# Patient Record
Sex: FEMALE | URBAN - METROPOLITAN AREA
[De-identification: names, ages, dates, MRNs, and addresses within clinical notes are randomized per-mention and may not be internally consistent; named-entity substitution may affect disease eponyms.]

---

## 2017-03-09 ENCOUNTER — OFFICE VISIT (OUTPATIENT)
Dept: URGENT CARE | Facility: URGENT CARE | Age: 27
End: 2017-03-09
Payer: COMMERCIAL

## 2017-03-09 VITALS
OXYGEN SATURATION: 99 % | WEIGHT: 196.6 LBS | SYSTOLIC BLOOD PRESSURE: 122 MMHG | BODY MASS INDEX: 35.27 KG/M2 | TEMPERATURE: 99.1 F | DIASTOLIC BLOOD PRESSURE: 73 MMHG | HEART RATE: 82 BPM

## 2017-03-09 DIAGNOSIS — N63.0 BREAST SWELLING: ICD-10-CM

## 2017-03-09 DIAGNOSIS — N61.1 ABSCESS OF BREAST, RIGHT: ICD-10-CM

## 2017-03-09 DIAGNOSIS — R68.83 CHILLS: Primary | ICD-10-CM

## 2017-03-09 LAB
BASOPHILS # BLD AUTO: 0 10E9/L (ref 0–0.2)
BASOPHILS NFR BLD AUTO: 0.2 %
DIFFERENTIAL METHOD BLD: ABNORMAL
EOSINOPHIL # BLD AUTO: 0.2 10E9/L (ref 0–0.7)
EOSINOPHIL NFR BLD AUTO: 2.5 %
ERYTHROCYTE [DISTWIDTH] IN BLOOD BY AUTOMATED COUNT: 16.5 % (ref 10–15)
HCT VFR BLD AUTO: 40.1 % (ref 35–47)
HGB BLD-MCNC: 12.9 G/DL (ref 11.7–15.7)
LYMPHOCYTES # BLD AUTO: 1.8 10E9/L (ref 0.8–5.3)
LYMPHOCYTES NFR BLD AUTO: 19.8 %
MCH RBC QN AUTO: 25.5 PG (ref 26.5–33)
MCHC RBC AUTO-ENTMCNC: 32.2 G/DL (ref 31.5–36.5)
MCV RBC AUTO: 79 FL (ref 78–100)
MONOCYTES # BLD AUTO: 0.5 10E9/L (ref 0–1.3)
MONOCYTES NFR BLD AUTO: 5.5 %
NEUTROPHILS # BLD AUTO: 6.4 10E9/L (ref 1.6–8.3)
NEUTROPHILS NFR BLD AUTO: 72 %
PLATELET # BLD AUTO: 230 10E9/L (ref 150–450)
RBC # BLD AUTO: 5.05 10E12/L (ref 3.8–5.2)
WBC # BLD AUTO: 8.9 10E9/L (ref 4–11)

## 2017-03-09 PROCEDURE — 99213 OFFICE O/P EST LOW 20 MIN: CPT | Performed by: PHYSICIAN ASSISTANT

## 2017-03-09 PROCEDURE — 36415 COLL VENOUS BLD VENIPUNCTURE: CPT | Performed by: PHYSICIAN ASSISTANT

## 2017-03-09 PROCEDURE — 85025 COMPLETE CBC W/AUTO DIFF WBC: CPT | Performed by: PHYSICIAN ASSISTANT

## 2017-03-09 RX ORDER — CLINDAMYCIN HCL 300 MG
300 CAPSULE ORAL 4 TIMES DAILY
Qty: 40 CAPSULE | Refills: 0 | Status: SHIPPED | OUTPATIENT
Start: 2017-03-09

## 2017-03-09 ASSESSMENT — ENCOUNTER SYMPTOMS
DIZZINESS: 0
TINGLING: 0
CHILLS: 0
FEVER: 0
VOMITING: 0
HEMOPTYSIS: 0
COUGH: 0
WHEEZING: 0
SHORTNESS OF BREATH: 0
SPUTUM PRODUCTION: 0
ABDOMINAL PAIN: 0
NAUSEA: 0

## 2017-03-09 NOTE — LETTER
[facility] [address]  [address]  [facility] [address]          March 9, 2017    RE:  Manjula Olson                                                                                                                                                       [address]   [facility] [address]            To whom it may concern:    Manjula Olson is under my professional care. Please excuse her from work   Friday and possibly Tuesday. Thanks!    Sincerely,        Christiano Davis PA-C

## 2017-03-09 NOTE — MR AVS SNAPSHOT
After Visit Summary   3/9/2017    Manjula Olson    MRN: [id number]           Patient Information     Date Of Birth          1990        Visit Information        Provider Department      3/9/2017 6:00 PM Christiano Davis PA-C [facility]        Today's Diagnoses     Chills    -  1    Breast swelling        Abscess of breast, right          Care Instructions      What Are Benign Breast Conditions?  Most breast conditions are benign (noncancerous), causing no serious harm to you. But all women are at some risk for breast cancer. Your risk increases as you grow older. So if you notice any breast changes that aren t normal for you, see your healthcare provider. This helps to make sure that you receive proper treatment if there is a problem.  Breast infection  Infections of the breast tissue can cause skin redness, warmth, and pain or tenderness. The most common infection is mastitis. This inflammation of the mammary glands can happen during breastfeeding. Mastitis and other breast infections are often treated with antibiotics.  Nipple discharge  Many women can squeeze a tiny amount of a clear or milky discharge from 1 or both nipples. This discharge may be normal. Other kinds of discharge may be symptoms of a breast condition. A dark discharge can be caused by an intraductal papilloma (a benign growth in a duct near the nipple). A nipple discharge that is dark or bloody, or that happens without squeezing your nipple, should be checked by your healthcare provider.    Fibrocystic changes  These benign changes may cause a thickening in the breasts. Breasts may be tender or painful. They may feel more dense in some areas than in others. Sometimes solid or fluid-filled lumps (cysts) may also form. Cysts may be smooth, soft or firm, and tender. They may become larger and more tender right before your period.    Benign breast lumps  Benign breast lumps come in all shapes, textures, and  sizes. A fibroadenoma (a lump of fibrous tissue) may be smooth, firm, and rubbery. This type of lump is usually painless and movable. Have any lump checked by your healthcare provider.    0782-7045 [employer]. [address] [place] [address]. All rights reserved. This information is not intended as a substitute for professional medical care. Always follow your healthcare professional's instructions.        Breast Lump, Uncertain Cause    A lump was found in your breast. Most breast lumps are not cancer. They may be caused by normal changes in the breast tissue due to hormone variations that occur with your menstrual cycle. Some women may form lumps that are painful and tender. Others may form lumps that are painless.  At this time, is not possible to be certain of the cause of your lump without further evaluation. This could include:    Another exam by your healthcare provider or a gynecologist    Imaging tests, such as a mammogram or ultrasound    Biopsy (procedure to remove small tissue samples from the breast lump)  Your healthcare provider will explain any additional testing that is needed. Be sure to get answers to any questions you may have.  Home care  Until a diagnosis is made, you may be advised to do the following:    If you are having breast pain:    Take an over-the-counter pain reliever, if directed to by your provider.    Wear a well-fitted bra or sports bra for extra support. If you have breast pain at night, try wearing the bra during sleep.    Apply a warm compress (towel soaked in warm water) to the breast. You may also use a hot water bottle.    Check your breasts each day. Keep a log of whether the lump seems to be changing in size or tenderness with your period. This can help your healthcare provider make the correct diagnosis.  Follow-up care  Follow up with your healthcare provider as directed. Keep all appointments. Also, prepare for any upcoming tests as  directed.  When to seek medical advice  Call your healthcare provider right away if any of these occur:    Fever of 100.4 F (38 C) or higher    Redness or swelling of the breast    Discharge from the nipple    Visible changes in the skin over the nipple or breast    Lump grows larger, feels very hard, or has an irregular shape    New lumps form    3021-4848 [employer]. [address] [place] [address]. All rights reserved. This information is not intended as a substitute for professional medical care. Always follow your healthcare professional's instructions.        * ABSCESS [Antibiotic treatment only]  An abscess (sometimes called a  boil ) occurs when bacteria get trapped under the skin and begin to grow. Pus forms inside the abscess as the body responds to the bacteria. An abscess can occur with an insect bite, ingrown hair, blocked oil gland, pimple, cyst, or puncture wound.  In the early stages, redness and tenderness are the only symptoms. Sometimes, this stage can be treated with antibiotics alone. If the abscess does not respond to antibiotic treatment, it will need to be drained with a small cut, under local anesthesia.  HOME CARE:    Soak the wound in hot water or apply hot packs (small towel soaked in hot water) to the area for 20 minutes at a time. Do this three to four times a day.    Apply antibiotic cream or ointment such as Bacitracin or Polysporin onto the skin 3-4 times a day, unless something else was prescribed.  Neosporin Plus  includes an antibiotic plus a local pain reliever.    Take all of the antibiotics until they are gone.    You may use acetaminophen (Tylenol) or ibuprofen (Motrin, Advil) to control pain, unless another pain medicine was prescribed. [ NOTE : If you have chronic liver or kidney disease or ever had a stomach ulcer or GI bleeding, talk with your doctor before using these any of these.]  FOLLOW UP as advised by our staff. Look at your wound each day  for the signs of worsening infection listed below.  GET PROMPT MEDICAL ATTENTION if any of the following occur:    An increase in redness or swelling    Red streaks in the skin leading away from the abscess    An increase in local pain or swelling    Fever of 100.4 F (38 C) or higher, or as directed by your healthcare provider    Pus or fluid coming from the abscess    9333-3879 [employer], [address] [place] [address], [place] [address]. All rights reserved. This information is not intended as a substitute for professional medical care. Always follow your healthcare professional's instructions.          Follow-ups after your visit        Additional Services     GENERAL SURG ADULT REFERRAL       Your provider has referred you to: [facility]: [facility] [phone]   [email or web address]    Please be aware that coverage of these services is subject to the terms and limitations of your health insurance plan.  Call member services at your health plan with any benefit or coverage questions.      Please bring the following with you to your appointment:    (1) Any X-Rays, CTs or MRIs which have been performed.  Contact the facility where they were done to arrange for  prior to your scheduled appointment.   (2) List of current medications   (3) This referral request   (4) Any documents/labs given to you for this referral                  Who to contact     If you have questions or need follow up information about today's clinic visit or your schedule please contact [facility] directly at [phone].  Normal or non-critical lab and imaging results will be communicated to you by MyChart, letter or phone within 4 business days after the clinic has received the results. If you do not hear from us within 7 days, please contact the clinic through MyChart or phone. If you have a critical or abnormal lab result, we  "will notify you by phone as soon as possible.  Submit refill requests through [employer] or call your pharmacy and they will forward the refill request to us. Please allow 3 business days for your refill to be completed.          Additional Information About Your Visit        [employer] Information     [employer] lets you send messages to your doctor, view your test results, renew your prescriptions, schedule appointments and more. To sign up, go to www.[facility] . Click on \"Log in\" on the left side of the screen, which will take you to the Welcome page. Then click on \"Sign up Now\" on the right side of the page.     You will be asked to enter the access code listed below, as well as some personal information. Please follow the directions to create your username and password.     Your access code is: [email or web address]  Expires: 2017  8:11 PM     Your access code will  in 90 days. If you need help or a new code, please call your [facility] clinic or [phone].        Care EveryWhere ID     This is your Care EveryWhere ID. This could be used by other organizations to access your [facility] medical records  [id number]        Your Vitals Were     Pulse Temperature Pulse Oximetry BMI (Body Mass Index)          82 99.1  F (37.3  C) (Oral) 99% 35.27 kg/m2         Blood Pressure from Last 3 Encounters:   17 122/73   16 105/60   10/02/15 108/62    Weight from Last 3 Encounters:   17 196 lb 9.6 oz (89.2 kg)   16 189 lb 8 oz (86 kg)   10/02/15 179 lb 8 oz (81.4 kg)              We Performed the Following     CBC with platelets and differential     GENERAL SURG ADULT REFERRAL          Today's Medication Changes          These changes are accurate as of: 3/9/17  8:11 PM.  If you have any questions, ask your nurse or doctor.               Start taking these medicines.        Dose/Directions    clindamycin 300 MG capsule   Commonly known as:  CLEOCIN   Used for:  Abscess of breast, right, Breast " swelling   Started by:  Christiano Davis PA-C        Dose:  300 mg   Take 1 capsule (300 mg) by mouth 4 times daily   Quantity:  40 capsule   Refills:  0            Where to get your medicines      These medications were sent to [facility][id number] [facility] [address] [place] [address] AT COR[facility]  [address] [place] [address]     Phone:  [phone]     clindamycin 300 MG capsule                Primary Care Provider Office Phone # Fax #    Nicole Harshal Kenney [phone]       [address] [address]        Thank you!     Thank you for choosing [facility]  for your care. Our goal is always to provide you with excellent care. Hearing back from our patients is one way we can continue to improve our services. Please take a few minutes to complete the written survey that you may receive in the mail after your visit with us. Thank you!             Your Updated Medication List - Protect others around you: Learn how to safely use, store and throw away your medicines at www.disposemymeds.org.          This list is accurate as of: 3/9/17  8:11 PM.  Always use your most recent med list.                   Brand Name Dispense Instructions for use    clindamycin 300 MG capsule    CLEOCIN    40 capsule    Take 1 capsule (300 mg) by mouth 4 times daily       nabumetone 750 MG tablet    RELAFEN    30 tablet    Take 1 tablet (750 mg) by mouth 2 times daily as needed for pain       senna-docusate 8.6-50 MG per tablet    SENOKOT-S;PERICOLACE    60 tablet    Take 1 tablet by mouth 2 times daily as needed for constipation

## 2017-03-10 NOTE — NURSING NOTE
"Chief Complaint   Patient presents with     Breast Pain     right        Initial /73 (BP Location: Left arm, Patient Position: Chair, Cuff Size: Adult Large)  Pulse 82  Temp 99.1  F (37.3  C) (Oral)  Wt 196 lb 9.6 oz (89.2 kg)  SpO2 99%  BMI 35.27 kg/m2 Estimated body mass index is 35.27 kg/(m^2) as calculated from the following:    Height as of 8/2/16: 5' 2.6\" (1.59 m).    Weight as of this encounter: 196 lb 9.6 oz (89.2 kg).  Medication Reconciliation: complete     Liat Sanchez MA    "

## 2017-03-10 NOTE — PROGRESS NOTES
HPI  Rt breast pain woke this AM, pains goes into back with certain motions, Flew to [place],   No hx clotting, not a smoker, no OBC, , lives at home alone with  , felt fine yesterday , felt fine   When went to bed.    Just joined gym Tuesday worked out on Tuesday ; + hx constipation, had good BM today  Pain centered in rt breast, says no breast masses or any such symptoms. Has has breast discharge for  Some time, has not breast fed for over 10 years    No hx asthma, no short of breath, cough 2 weeks ago, cold like sx, last weekend had headache   Migraine like    No FH breast CA, but mother  from unknown reasons        Review of Systems   Constitutional: Negative for chills and fever.   HENT: Negative for congestion.    Respiratory: Negative for cough, hemoptysis, sputum production, shortness of breath and wheezing.    Gastrointestinal: Negative for abdominal pain, nausea and vomiting.   Neurological: Negative for dizziness and tingling.         Physical Exam   Constitutional: She is oriented to person, place, and time and well-developed, well-nourished, and in no distress. No distress.   HENT:   Head: Normocephalic.   Right Ear: External ear normal.   Left Ear: External ear normal.   Nose: Nose normal.   Mouth/Throat: Oropharynx is clear and moist.   Nl tonsils, nl midline uvula, mucous membranes moist, no lesions, No cervical, no axillary, no epitrochlear, no inguinal, no popliteal, no supraclavicular adenopathy, no hepatosplenomegaly     Eyes: Conjunctivae and EOM are normal. Pupils are equal, round, and reactive to light. Right eye exhibits no discharge. Left eye exhibits no discharge. No scleral icterus.   Neck: Normal range of motion. Neck supple. No tracheal deviation present. No thyromegaly present.   No passive neck flexion pain in supine position     Cardiovascular: Normal rate, regular rhythm, normal heart sounds and intact distal pulses.    No murmur heard.  Pulmonary/Chest: Effort normal and  breath sounds normal. No respiratory distress. She has no wheezes. She exhibits no tenderness.   Respiratory rate normal, no stridor, no respiratory distress,No wheeze, no retractions, no rales, nl bronchophony and fremitus, non-tender to palp      + rt breast 5 x 7 palpable tender mass, smooth, deep, much like a cystic mass or forming abscess with no fluctuance, no head, + very ttp, no expressible dc from nipple, mass extends from deep behind nipple 2 -3 cm medial and lateral and then superiorly towards but not into tail of haq with no palpable axillary nodes+, with pt anteriorly flex at hips no breast deformity, trapping no peu de'orange appearance. Is symmetric bilat breasts   Abdominal: Soft. Bowel sounds are normal. She exhibits no distension. There is no tenderness. There is no guarding.   rounded, symmetric, normal bowel sounds, neg Anthony's sign, neg Mcburney's sig, no Kehr's sign, heel drop pain, neg psoas sign and neg obturator sign     Musculoskeletal: Normal range of motion.   Lymphadenopathy:     She has no cervical adenopathy.   Neurological: She is alert and oriented to person, place, and time. No cranial nerve deficit. Gait normal. Coordination normal.   Skin: Skin is warm. No rash noted. She is not diaphoretic. No erythema.   Psychiatric: Affect normal.     Plan:   Discussed at length [facility], discussed that rarely CA  Can start like  thsi her hx is much more consistent with an abscess. I walked pt to [facility] to get asap surgical consult.

## 2017-03-10 NOTE — PATIENT INSTRUCTIONS
What Are Benign Breast Conditions?  Most breast conditions are benign (noncancerous), causing no serious harm to you. But all women are at some risk for breast cancer. Your risk increases as you grow older. So if you notice any breast changes that aren t normal for you, see your healthcare provider. This helps to make sure that you receive proper treatment if there is a problem.  Breast infection  Infections of the breast tissue can cause skin redness, warmth, and pain or tenderness. The most common infection is mastitis. This inflammation of the mammary glands can happen during breastfeeding. Mastitis and other breast infections are often treated with antibiotics.  Nipple discharge  Many women can squeeze a tiny amount of a clear or milky discharge from 1 or both nipples. This discharge may be normal. Other kinds of discharge may be symptoms of a breast condition. A dark discharge can be caused by an intraductal papilloma (a benign growth in a duct near the nipple). A nipple discharge that is dark or bloody, or that happens without squeezing your nipple, should be checked by your healthcare provider.    Fibrocystic changes  These benign changes may cause a thickening in the breasts. Breasts may be tender or painful. They may feel more dense in some areas than in others. Sometimes solid or fluid-filled lumps (cysts) may also form. Cysts may be smooth, soft or firm, and tender. They may become larger and more tender right before your period.    Benign breast lumps  Benign breast lumps come in all shapes, textures, and sizes. A fibroadenoma (a lump of fibrous tissue) may be smooth, firm, and rubbery. This type of lump is usually painless and movable. Have any lump checked by your healthcare provider.    4008-6746 [employer]. [address] [place] [address]. All rights reserved. This information is not intended as a substitute for professional medical care. Always follow your healthcare  professional's instructions.        Breast Lump, Uncertain Cause    A lump was found in your breast. Most breast lumps are not cancer. They may be caused by normal changes in the breast tissue due to hormone variations that occur with your menstrual cycle. Some women may form lumps that are painful and tender. Others may form lumps that are painless.  At this time, is not possible to be certain of the cause of your lump without further evaluation. This could include:    Another exam by your healthcare provider or a gynecologist    Imaging tests, such as a mammogram or ultrasound    Biopsy (procedure to remove small tissue samples from the breast lump)  Your healthcare provider will explain any additional testing that is needed. Be sure to get answers to any questions you may have.  Home care  Until a diagnosis is made, you may be advised to do the following:    If you are having breast pain:    Take an over-the-counter pain reliever, if directed to by your provider.    Wear a well-fitted bra or sports bra for extra support. If you have breast pain at night, try wearing the bra during sleep.    Apply a warm compress (towel soaked in warm water) to the breast. You may also use a hot water bottle.    Check your breasts each day. Keep a log of whether the lump seems to be changing in size or tenderness with your period. This can help your healthcare provider make the correct diagnosis.  Follow-up care  Follow up with your healthcare provider as directed. Keep all appointments. Also, prepare for any upcoming tests as directed.  When to seek medical advice  Call your healthcare provider right away if any of these occur:    Fever of 100.4 F (38 C) or higher    Redness or swelling of the breast    Discharge from the nipple    Visible changes in the skin over the nipple or breast    Lump grows larger, feels very hard, or has an irregular shape    New lumps form    4501-4827 [employer]. [address] [place] [address],  [place] [address]. All rights reserved. This information is not intended as a substitute for professional medical care. Always follow your healthcare professional's instructions.        * ABSCESS [Antibiotic treatment only]  An abscess (sometimes called a  boil ) occurs when bacteria get trapped under the skin and begin to grow. Pus forms inside the abscess as the body responds to the bacteria. An abscess can occur with an insect bite, ingrown hair, blocked oil gland, pimple, cyst, or puncture wound.  In the early stages, redness and tenderness are the only symptoms. Sometimes, this stage can be treated with antibiotics alone. If the abscess does not respond to antibiotic treatment, it will need to be drained with a small cut, under local anesthesia.  HOME CARE:    Soak the wound in hot water or apply hot packs (small towel soaked in hot water) to the area for 20 minutes at a time. Do this three to four times a day.    Apply antibiotic cream or ointment such as Bacitracin or Polysporin onto the skin 3-4 times a day, unless something else was prescribed.  Neosporin Plus  includes an antibiotic plus a local pain reliever.    Take all of the antibiotics until they are gone.    You may use acetaminophen (Tylenol) or ibuprofen (Motrin, Advil) to control pain, unless another pain medicine was prescribed. [ NOTE : If you have chronic liver or kidney disease or ever had a stomach ulcer or GI bleeding, talk with your doctor before using these any of these.]  FOLLOW UP as advised by our staff. Look at your wound each day for the signs of worsening infection listed below.  GET PROMPT MEDICAL ATTENTION if any of the following occur:    An increase in redness or swelling    Red streaks in the skin leading away from the abscess    An increase in local pain or swelling    Fever of 100.4 F (38 C) or higher, or as directed by your healthcare provider    Pus or fluid coming from the abscess    0784-6505 [address] [place] [address]. All rights reserved. This information is not intended as a substitute for professional medical care. Always follow your healthcare professional's instructions.

## 2017-03-10 NOTE — PROGRESS NOTES
SUBJECTIVE:                                                    Manjula Olson is a 26 year old female who presents to clinic today for the following health issues:      Right breast pain  Started this morning  Pain and chills  Pain was just on one side and now the whole breast  Took tylenol some relief but pain came back        Problem list and histories reviewed & adjusted, as indicated.  Additional history:         Reviewed and updated as needed this visit by clinical staff  Tobacco  Allergies  Meds       Reviewed and updated as needed this visit by Provider

## 2017-03-13 ENCOUNTER — OFFICE VISIT (OUTPATIENT)
Dept: SURGERY | Facility: CLINIC | Age: 27
End: 2017-03-13
Payer: COMMERCIAL

## 2017-03-13 VITALS
DIASTOLIC BLOOD PRESSURE: 68 MMHG | WEIGHT: 200.6 LBS | RESPIRATION RATE: 16 BRPM | HEART RATE: 73 BPM | HEIGHT: 63 IN | BODY MASS INDEX: 35.54 KG/M2 | SYSTOLIC BLOOD PRESSURE: 110 MMHG

## 2017-03-13 DIAGNOSIS — N63.0 BREAST MASS: Primary | ICD-10-CM

## 2017-03-13 LAB — BETA HCG QUAL IFA URINE: NEGATIVE

## 2017-03-13 PROCEDURE — 99203 OFFICE O/P NEW LOW 30 MIN: CPT | Performed by: SURGERY

## 2017-03-13 PROCEDURE — 84703 CHORIONIC GONADOTROPIN ASSAY: CPT | Performed by: SURGERY

## 2017-03-13 ASSESSMENT — PAIN SCALES - GENERAL: PAINLEVEL: MILD PAIN (3)

## 2017-03-13 NOTE — PROGRESS NOTES
General Surgery Consultation    Assessment:  Infected breast    Plan:  The infection is markedly improved, and no abscess was noted, so no need for I&D and will have her finish her course of antibiotics.     She has a mass noted on breast exam so will get diagnostic mamogram to r/o breast cancer, could be resolving abscess.       HPI:  Manjula presents today in consultation for an infected breast for the past few days. It started as right breast pain a few days ago, she went to urgent care and her breast had swelled to twice its size.  She was given antibiotics and did not have drainage from the site.  She has had no infections in the past in this location.  She has not had a previous incision and drainage procedure in this location.      Fever/Chills: No  Currently on antibiotics: Yes - Clindamycin        PMH:  Past Medical History   Diagnosis Date     ASCUS with positive high risk HPV 7/15/13     + HR HPV (17). repeat pap due in 7/2014       PSH:  Past Surgical History   Procedure Laterality Date     No history of surgery         Allergies:  No Known Allergies    Home Medications:  Current Outpatient Prescriptions   Medication Sig Dispense Refill     clindamycin (CLEOCIN) 300 MG capsule Take 1 capsule (300 mg) by mouth 4 times daily 40 capsule 0     senna-docusate (SENOKOT-S;PERICOLACE) 8.6-50 MG per tablet Take 1 tablet by mouth 2 times daily as needed for constipation (Patient not taking: Reported on 3/9/2017) 60 tablet 2     nabumetone (RELAFEN) 750 MG tablet Take 1 tablet (750 mg) by mouth 2 times daily as needed for pain (Patient not taking: Reported on 3/9/2017) 30 tablet 1       Social History:  Social History   Substance Use Topics     Smoking status: Never Smoker     Smokeless tobacco: Never Used     Alcohol use No       ROS:  General: negative for fever or chills  Skin: negative for, bruising, lumps or bumps  Ears/Nose/Throat: negative for, epistaxis, bleeding gums  Respiratory: No shortness of breath,  "dyspnea on exertion, cough, or hemoptysis  Cardiovascular: negative for and chest pain  Gastrointestinal: negative for, nausea, vomiting and abdominal pain  Genitourinary: negative for and frequency  Neurologic: negative for and local weakness  Hematologic/Lymphatic/Immunologic: negative for, bleeding disorder and frequent infections  Endocrine: negative for, diabetes, polydipsia and polyuria    PE:  /68 (BP Location: Right arm, Patient Position: Chair, Cuff Size: Adult Large)  Pulse 73  Resp 16  Ht 5' 2.6\" (1.59 m)  Wt 200 lb 9.6 oz (91 kg)  BMI 35.99 kg/m2  General appearance: well-nourished, no apparent distress  ENT: Mucous membranes moist  Lungs: respirations unlabored  Neurologic: nonfocal, grossly intact times four extremities, alert and oriented times three  Musculoskeletal: No gross deformity  Psychiatric: mood and affect are appropriate  Skin: there is a 2 cm mass located in the right breast at 8-oclock just lateral to the nipple without tenderness to palpation.  There is no surrounding cellulitis.    Juancho Gillespie, DO          "

## 2017-03-13 NOTE — Clinical Note
I had the pleasure of seeing Manjula Olson in the clinic for her breast infection. It looks markedly better and there are no residual signs of infection. She did have a painless mass on exam, it could be some left over inflammation but I sent her for a mammogram to be sure.   Thanks for allowing me the chance to participate in her care.  Sincerely,  Juancho Gillespie  General Surgery  Head and Neck/Endocrine Surgery

## 2017-03-13 NOTE — MR AVS SNAPSHOT
After Visit Summary   3/13/2017    Manjula Olson    MRN: [id number]           Patient Information     Date Of Birth          1990        Visit Information        Provider Department      3/13/2017 10:00 AM Juancho Gillespie DO [facility]        Today's Diagnoses     Breast mass    -  1      Care Instructions      Breast Lump, Uncertain Cause    A lump was found in your breast. Most breast lumps are not cancer. They may be caused by normal changes in the breast tissue due to hormone variations that occur with your menstrual cycle. Some women may form lumps that are painful and tender. Others may form lumps that are painless.  At this time, is not possible to be certain of the cause of your lump without further evaluation. This could include:    Another exam by your healthcare provider or a gynecologist    Imaging tests, such as a mammogram or ultrasound    Biopsy (procedure to remove small tissue samples from the breast lump)  Your healthcare provider will explain any additional testing that is needed. Be sure to get answers to any questions you may have.  Home care  Until a diagnosis is made, you may be advised to do the following:    If you are having breast pain:    Take an over-the-counter pain reliever, if directed to by your provider.    Wear a well-fitted bra or sports bra for extra support. If you have breast pain at night, try wearing the bra during sleep.    Apply a warm compress (towel soaked in warm water) to the breast. You may also use a hot water bottle.    Check your breasts each day. Keep a log of whether the lump seems to be changing in size or tenderness with your period. This can help your healthcare provider make the correct diagnosis.  Follow-up care  Follow up with your healthcare provider as directed. Keep all appointments. Also, prepare for any upcoming tests as directed.  When to seek medical advice  Call your healthcare provider right away if any of these  "occur:    Fever of 100.4 F (38 C) or higher    Redness or swelling of the breast    Discharge from the nipple    Visible changes in the skin over the nipple or breast    Lump grows larger, feels very hard, or has an irregular shape    New lumps form    8938-2782 [employer]. [address] [place] [address]. All rights reserved. This information is not intended as a substitute for professional medical care. Always follow your healthcare professional's instructions.              Follow-ups after your visit        Future tests that were ordered for you today     Open Future Orders        Priority Expected Expires Ordered    MA Diagnostic Digital Bilateral Routine  3/13/2018 3/13/2017            Who to contact     If you have questions or need follow up information about today's clinic visit or your schedule please contact [facility] directly at [phone].  Normal or non-critical lab and imaging results will be communicated to you by MyChart, letter or phone within 4 business days after the clinic has received the results. If you do not hear from us within 7 days, please contact the clinic through [employer] or phone. If you have a critical or abnormal lab result, we will notify you by phone as soon as possible.  Submit refill requests through [employer] or call your pharmacy and they will forward the refill request to us. Please allow 3 business days for your refill to be completed.          Additional Information About Your Visit        [employer] Information     [employer] lets you send messages to your doctor, view your test results, renew your prescriptions, schedule appointments and more. To sign up, go to [email or web address] . Click on \"Log in\" on the left side of the screen, which will take you to the Welcome page. Then click on \"Sign up Now\" on the right side of the page.     You will be asked to enter the access code listed below, as well as some personal information. Please " "follow the directions to create your username and password.     Your access code is: [email or web address]  Expires: 2017  9:11 PM     Your access code will  in 90 days. If you need help or a new code, please call your [place] clinic or [phone].        Care EveryWhere ID     This is your Care EveryWhere ID. This could be used by other organizations to access your [place] medical records  [id number]        Your Vitals Were     Pulse Respirations Height BMI (Body Mass Index)          73 16 1.59 m (5' 2.6\") 35.99 kg/m2         Blood Pressure from Last 3 Encounters:   17 110/68   17 122/73   16 105/60    Weight from Last 3 Encounters:   17 91 kg (200 lb 9.6 oz)   17 89.2 kg (196 lb 9.6 oz)   16 86 kg (189 lb 8 oz)              We Performed the Following     Beta HCG qual IFA urine        Primary Care Provider Office Phone # Fax #    Nicole Harshal Kenney [phone]       [address] [address]        Thank you!     Thank you for choosing [facility]  for your care. Our goal is always to provide you with excellent care. Hearing back from our patients is one way we can continue to improve our services. Please take a few minutes to complete the written survey that you may receive in the mail after your visit with us. Thank you!             Your Updated Medication List - Protect others around you: Learn how to safely use, store and throw away your medicines at www.disposemymeds.org.          This list is accurate as of: 3/13/17 10:48 AM.  Always use your most recent med list.                   Brand Name Dispense Instructions for use    clindamycin 300 MG capsule    CLEOCIN    40 capsule    Take 1 capsule (300 mg) by mouth 4 times daily       nabumetone 750 MG tablet    RELAFEN    30 tablet    Take 1 tablet (750 mg) by mouth 2 times daily as needed for pain       senna-docusate 8.6-50 MG per tablet "    SENOKOT-S;PERICOLACE    60 tablet    Take 1 tablet by mouth 2 times daily as needed for constipation

## 2017-03-13 NOTE — NURSING NOTE
"Chief Complaint   Patient presents with     Consult     Right breast swelling and pain x 4 days. Abcess of left breast.        Initial /68 (BP Location: Right arm, Patient Position: Chair, Cuff Size: Adult Large)  Pulse 73  Resp 16  Ht 5' 2.6\" (1.59 m)  Wt 200 lb 9.6 oz (91 kg)  BMI 35.99 kg/m2 Estimated body mass index is 35.99 kg/(m^2) as calculated from the following:    Height as of this encounter: 5' 2.6\" (1.59 m).    Weight as of this encounter: 200 lb 9.6 oz (91 kg).  Medication Reconciliation: complete   Janis Mcmahan CMA          "

## 2017-03-13 NOTE — PATIENT INSTRUCTIONS
Breast Lump, Uncertain Cause    A lump was found in your breast. Most breast lumps are not cancer. They may be caused by normal changes in the breast tissue due to hormone variations that occur with your menstrual cycle. Some women may form lumps that are painful and tender. Others may form lumps that are painless.  At this time, is not possible to be certain of the cause of your lump without further evaluation. This could include:    Another exam by your healthcare provider or a gynecologist    Imaging tests, such as a mammogram or ultrasound    Biopsy (procedure to remove small tissue samples from the breast lump)  Your healthcare provider will explain any additional testing that is needed. Be sure to get answers to any questions you may have.  Home care  Until a diagnosis is made, you may be advised to do the following:    If you are having breast pain:    Take an over-the-counter pain reliever, if directed to by your provider.    Wear a well-fitted bra or sports bra for extra support. If you have breast pain at night, try wearing the bra during sleep.    Apply a warm compress (towel soaked in warm water) to the breast. You may also use a hot water bottle.    Check your breasts each day. Keep a log of whether the lump seems to be changing in size or tenderness with your period. This can help your healthcare provider make the correct diagnosis.  Follow-up care  Follow up with your healthcare provider as directed. Keep all appointments. Also, prepare for any upcoming tests as directed.  When to seek medical advice  Call your healthcare provider right away if any of these occur:    Fever of 100.4 F (38 C) or higher    Redness or swelling of the breast    Discharge from the nipple    Visible changes in the skin over the nipple or breast    Lump grows larger, feels very hard, or has an irregular shape    New lumps form    5574-7764 [employer]. [address] [place] [address], [place] [address]. All rights  reserved. This information is not intended as a substitute for professional medical care. Always follow your healthcare professional's instructions.

## 2017-03-14 ENCOUNTER — RADIANT APPOINTMENT (OUTPATIENT)
Dept: MAMMOGRAPHY | Facility: CLINIC | Age: 27
End: 2017-03-14
Attending: SURGERY
Payer: COMMERCIAL

## 2017-03-14 ENCOUNTER — RADIANT APPOINTMENT (OUTPATIENT)
Dept: ULTRASOUND IMAGING | Facility: CLINIC | Age: 27
End: 2017-03-14
Attending: SURGERY
Payer: COMMERCIAL

## 2017-03-14 DIAGNOSIS — N63.0 BREAST MASS: ICD-10-CM

## 2017-03-14 PROCEDURE — 76642 ULTRASOUND BREAST LIMITED: CPT | Mod: RT | Performed by: RADIOLOGY

## 2017-03-14 PROCEDURE — G0204 DX MAMMO INCL CAD BI: HCPCS | Performed by: RADIOLOGY

## 2018-10-09 ENCOUNTER — HEALTH MAINTENANCE LETTER (OUTPATIENT)
Age: 28
End: 2018-10-09